# Patient Record
Sex: FEMALE | Race: WHITE
[De-identification: names, ages, dates, MRNs, and addresses within clinical notes are randomized per-mention and may not be internally consistent; named-entity substitution may affect disease eponyms.]

---

## 2017-03-01 ENCOUNTER — HOSPITAL ENCOUNTER (OUTPATIENT)
Dept: HOSPITAL 80 - BRMIMAGING | Age: 42
End: 2017-03-01
Attending: GENERAL ACUTE CARE HOSPITAL
Payer: COMMERCIAL

## 2017-03-01 DIAGNOSIS — Z12.31: Primary | ICD-10-CM

## 2017-03-01 PROCEDURE — G0202 SCR MAMMO BI INCL CAD: HCPCS

## 2018-03-26 ENCOUNTER — HOSPITAL ENCOUNTER (OUTPATIENT)
Dept: HOSPITAL 80 - BRMIMAGING | Age: 43
End: 2018-03-26
Attending: GENERAL ACUTE CARE HOSPITAL
Payer: COMMERCIAL

## 2018-03-26 DIAGNOSIS — Z12.31: Primary | ICD-10-CM

## 2019-03-27 ENCOUNTER — HOSPITAL ENCOUNTER (OUTPATIENT)
Dept: HOSPITAL 80 - BRMIMAGING | Age: 44
End: 2019-03-27
Attending: GENERAL ACUTE CARE HOSPITAL
Payer: COMMERCIAL

## 2019-03-27 DIAGNOSIS — Z80.3: ICD-10-CM

## 2019-03-27 DIAGNOSIS — Z12.31: Primary | ICD-10-CM

## 2021-12-10 ENCOUNTER — APPOINTMENT (RX ONLY)
Dept: URBAN - METROPOLITAN AREA CLINIC 134 | Facility: CLINIC | Age: 46
Setting detail: DERMATOLOGY
End: 2021-12-10

## 2021-12-10 DIAGNOSIS — L91.8 OTHER HYPERTROPHIC DISORDERS OF THE SKIN: ICD-10-CM

## 2021-12-10 DIAGNOSIS — B07.8 OTHER VIRAL WARTS: ICD-10-CM

## 2021-12-10 DIAGNOSIS — L72.0 EPIDERMAL CYST: ICD-10-CM

## 2021-12-10 DIAGNOSIS — B35.1 TINEA UNGUIUM: ICD-10-CM

## 2021-12-10 DIAGNOSIS — D22 MELANOCYTIC NEVI: ICD-10-CM

## 2021-12-10 DIAGNOSIS — Z71.89 OTHER SPECIFIED COUNSELING: ICD-10-CM

## 2021-12-10 PROBLEM — D22.5 MELANOCYTIC NEVI OF TRUNK: Status: ACTIVE | Noted: 2021-12-10

## 2021-12-10 PROCEDURE — 17110 DESTRUCTION B9 LES UP TO 14: CPT

## 2021-12-10 PROCEDURE — ? COUNSELING

## 2021-12-10 PROCEDURE — ? PRESCRIPTION

## 2021-12-10 PROCEDURE — ? LIQUID NITROGEN

## 2021-12-10 PROCEDURE — 99203 OFFICE O/P NEW LOW 30 MIN: CPT | Mod: 25

## 2021-12-10 RX ORDER — CICLOPIROX 80 MG/ML
SOLUTION TOPICAL
Qty: 6.6 | Refills: 6 | Status: ERX | COMMUNITY
Start: 2021-12-10

## 2021-12-10 RX ADMIN — CICLOPIROX: 80 SOLUTION TOPICAL at 00:00

## 2021-12-10 ASSESSMENT — LOCATION DETAILED DESCRIPTION DERM
LOCATION DETAILED: LEFT GREAT TOENAIL
LOCATION DETAILED: RIGHT AXILLARY VAULT
LOCATION DETAILED: RIGHT KNEE
LOCATION DETAILED: LEFT INFERIOR FOREHEAD
LOCATION DETAILED: SUPERIOR THORACIC SPINE
LOCATION DETAILED: RIGHT MEDIAL BREAST 2-3:00 REGION

## 2021-12-10 ASSESSMENT — LOCATION ZONE DERM
LOCATION ZONE: LEG
LOCATION ZONE: AXILLAE
LOCATION ZONE: FACE
LOCATION ZONE: TOENAIL
LOCATION ZONE: TRUNK

## 2021-12-10 ASSESSMENT — LOCATION SIMPLE DESCRIPTION DERM
LOCATION SIMPLE: RIGHT KNEE
LOCATION SIMPLE: RIGHT AXILLARY VAULT
LOCATION SIMPLE: UPPER BACK
LOCATION SIMPLE: RIGHT BREAST
LOCATION SIMPLE: LEFT FOREHEAD
LOCATION SIMPLE: LEFT GREAT TOE

## 2021-12-10 NOTE — PROCEDURE: LIQUID NITROGEN
Show Topical Anesthesia Variable?: Yes
Post-Care Instructions: I reviewed with the patient in detail post-care instructions. Patient is to wear sunprotection, and avoid picking at any of the treated lesions. Pt may apply Vaseline to crusted or scabbing areas.
Duration Of Freeze Thaw-Cycle (Seconds): 5-10
Number Of Freeze-Thaw Cycles: 3 freeze-thaw cycles
Medical Necessity Clause: This procedure was medically necessary because the lesions that were treated were:
Render Post-Care Instructions In Note?: no
Medical Necessity Information: It is in your best interest to select a reason for this procedure from the list below. All of these items fulfill various CMS LCD requirements except the new and changing color options.
Detail Level: Detailed
Consent: The patient's consent was obtained including but not limited to risks of crusting, scabbing, blistering, scarring, darker or lighter pigmentary change, recurrence, incomplete removal and infection.
Application Tool (Optional): Liquid Nitrogen Sprayer

## 2022-12-08 ENCOUNTER — APPOINTMENT (RX ONLY)
Dept: URBAN - METROPOLITAN AREA CLINIC 134 | Facility: CLINIC | Age: 47
Setting detail: DERMATOLOGY
End: 2022-12-08

## 2022-12-08 DIAGNOSIS — L81.4 OTHER MELANIN HYPERPIGMENTATION: ICD-10-CM

## 2022-12-08 DIAGNOSIS — B35.1 TINEA UNGUIUM: ICD-10-CM

## 2022-12-08 DIAGNOSIS — Z80.8 FAMILY HISTORY OF MALIGNANT NEOPLASM OF OTHER ORGANS OR SYSTEMS: ICD-10-CM

## 2022-12-08 DIAGNOSIS — D22 MELANOCYTIC NEVI: ICD-10-CM

## 2022-12-08 DIAGNOSIS — H01.13 ECZEMATOUS DERMATITIS OF EYELID: ICD-10-CM

## 2022-12-08 PROBLEM — H01.132 ECZEMATOUS DERMATITIS OF RIGHT LOWER EYELID: Status: ACTIVE | Noted: 2022-12-08

## 2022-12-08 PROBLEM — H01.131 ECZEMATOUS DERMATITIS OF RIGHT UPPER EYELID: Status: ACTIVE | Noted: 2022-12-08

## 2022-12-08 PROBLEM — H01.139 ECZEMATOUS DERMATITIS OF UNSPECIFIED EYE, UNSPECIFIED EYELID: Status: ACTIVE | Noted: 2022-12-08

## 2022-12-08 PROBLEM — D22.5 MELANOCYTIC NEVI OF TRUNK: Status: ACTIVE | Noted: 2022-12-08

## 2022-12-08 PROCEDURE — ? COUNSELING

## 2022-12-08 PROCEDURE — ? TREATMENT REGIMEN

## 2022-12-08 PROCEDURE — 99213 OFFICE O/P EST LOW 20 MIN: CPT

## 2022-12-08 PROCEDURE — ? PRESCRIPTION

## 2022-12-08 RX ORDER — HYDROCORTISONE 25 MG/G
OINTMENT TOPICAL BID
Qty: 28.35 | Refills: 1 | Status: ERX | COMMUNITY
Start: 2022-12-08

## 2022-12-08 RX ADMIN — HYDROCORTISONE: 25 OINTMENT TOPICAL at 00:00

## 2022-12-08 ASSESSMENT — SEVERITY ASSESSMENT: SEVERITY: MILD TO MODERATE

## 2022-12-08 ASSESSMENT — LOCATION SIMPLE DESCRIPTION DERM
LOCATION SIMPLE: LEFT GREAT TOE
LOCATION SIMPLE: RIGHT SUPERIOR EYELID
LOCATION SIMPLE: RIGHT UPPER BACK
LOCATION SIMPLE: LEFT CHEEK
LOCATION SIMPLE: LEFT EYEBROW
LOCATION SIMPLE: RIGHT LOWER BACK
LOCATION SIMPLE: RIGHT INFERIOR EYELID

## 2022-12-08 ASSESSMENT — LOCATION ZONE DERM
LOCATION ZONE: FACE
LOCATION ZONE: TOENAIL
LOCATION ZONE: TRUNK
LOCATION ZONE: EYELID

## 2022-12-08 ASSESSMENT — LOCATION DETAILED DESCRIPTION DERM
LOCATION DETAILED: RIGHT LATERAL SUPERIOR EYELID
LOCATION DETAILED: LEFT CENTRAL EYEBROW
LOCATION DETAILED: RIGHT SUPERIOR MEDIAL MIDBACK
LOCATION DETAILED: LEFT SUPERIOR CENTRAL MALAR CHEEK
LOCATION DETAILED: RIGHT SUPERIOR MEDIAL UPPER BACK
LOCATION DETAILED: LEFT GREAT TOENAIL
LOCATION DETAILED: RIGHT MEDIAL INFERIOR EYELID

## 2022-12-08 ASSESSMENT — PAIN INTENSITY VAS: HOW INTENSE IS YOUR PAIN 0 BEING NO PAIN, 10 BEING THE MOST SEVERE PAIN POSSIBLE?: 3/10 PAIN

## 2022-12-08 NOTE — PROCEDURE: COUNSELING
Detail Level: Detailed
Patient Specific Counseling (Will Not Stick From Patient To Patient): Recommend switching to Vaseline or CeraVe healing ointment and discontinuing Aquaphor.\\nIf dermatitis recurred after 2 week hydrocortisone therapy, discussed seeing allergist for patch testing.\\nApply ointment on eyelids prior to showering
Sunscreen Recommendations: Broad Spectrum Sunscreen 30+
Detail Level: Generalized
Detail Level: Simple

## 2022-12-08 NOTE — PROCEDURE: TREATMENT REGIMEN
Detail Level: Detailed
Initiate Treatment: Zyrtec 10mg BID \\nHydrocortisone 2.5% ointment to aa BID X 2 weeks
Plan: FU in one month
Otc Regimen: Stop using aerosolized air freshener, switch to all free and clear detergent\\nSwitch to Vaseline ointment or CeraVe healing ointment
Plan: Patient not interested in PO antifungals at this time

## 2022-12-08 NOTE — PROCEDURE: MIPS QUALITY
Quality 130: Documentation Of Current Medications In The Medical Record: Current Medications Documented
Detail Level: Generalized
Quality 431: Preventive Care And Screening: Unhealthy Alcohol Use - Screening: Patient not identified as an unhealthy alcohol user when screened for unhealthy alcohol use using a systematic screening method
Quality 110: Preventive Care And Screening: Influenza Immunization: Influenza Immunization previously received during influenza season

## 2023-01-04 ENCOUNTER — RX ONLY (OUTPATIENT)
Age: 48
Setting detail: RX ONLY
End: 2023-01-04

## 2023-01-04 RX ORDER — PIMECROLIMUS 10 MG/G
CREAM TOPICAL
Qty: 30 | Refills: 2 | Status: ERX | COMMUNITY
Start: 2023-01-03

## 2023-01-26 ENCOUNTER — APPOINTMENT (RX ONLY)
Dept: URBAN - METROPOLITAN AREA CLINIC 12 | Facility: CLINIC | Age: 48
Setting detail: DERMATOLOGY
End: 2023-01-26

## 2023-01-26 DIAGNOSIS — L20.89 OTHER ATOPIC DERMATITIS: ICD-10-CM | Status: INADEQUATELY CONTROLLED

## 2023-01-26 PROCEDURE — ? TREATMENT REGIMEN

## 2023-01-26 PROCEDURE — ? PRESCRIPTION

## 2023-01-26 PROCEDURE — ? COUNSELING

## 2023-01-26 PROCEDURE — 99214 OFFICE O/P EST MOD 30 MIN: CPT

## 2023-01-26 ASSESSMENT — LOCATION SIMPLE DESCRIPTION DERM
LOCATION SIMPLE: RIGHT INFERIOR EYELID
LOCATION SIMPLE: LEFT EYEBROW
LOCATION SIMPLE: RIGHT SUPERIOR EYELID
LOCATION SIMPLE: LEFT CHEEK

## 2023-01-26 ASSESSMENT — LOCATION ZONE DERM
LOCATION ZONE: FACE
LOCATION ZONE: EYELID

## 2023-01-26 ASSESSMENT — LOCATION DETAILED DESCRIPTION DERM
LOCATION DETAILED: LEFT SUPERIOR CENTRAL MALAR CHEEK
LOCATION DETAILED: RIGHT MEDIAL INFERIOR EYELID
LOCATION DETAILED: RIGHT LATERAL SUPERIOR EYELID
LOCATION DETAILED: LEFT CENTRAL EYEBROW

## 2023-01-26 NOTE — PROCEDURE: TREATMENT REGIMEN
Detail Level: Detailed
Discontinue Regimen: Systane eye drops daily
Continue Regimen: Hydrocortisone 2.5% ointment apply to eyelids BID for 1-2 weeks when flared then switch to Opzelura cream
Plan: STOP using Systane eye drops (contains propylene glycol) and switch to Refresh lubricant eye drops (propylene glycol-free). Refer to [facility] for patch testing\\n\\nFollow up in 6 weeks or after patch testing
Samples Given: Opzelura cream
Otc Regimen: Vaseline or Aquaphor

## 2023-01-26 NOTE — PROCEDURE: COUNSELING
Detail Level: Detailed
Patient Specific Counseling (Will Not Stick From Patient To Patient): Recommend switching to Vaseline or CeraVe healing ointment and discontinuing Aquaphor.\\nIf dermatitis recurred after 2 week hydrocortisone therapy, discussed seeing allergist for patch testing.\\nApply ointment on eyelids prior to showering

## 2023-01-27 RX ADMIN — RUXOLITINIB APPLY: 15 CREAM TOPICAL at 00:00

## 2023-01-28 RX ORDER — RUXOLITINIB 15 MG/G
CREAM TOPICAL BID
Qty: 60 | Refills: 2 | Status: ERX | COMMUNITY
Start: 2023-01-27

## 2024-02-01 ENCOUNTER — APPOINTMENT (RX ONLY)
Dept: URBAN - METROPOLITAN AREA CLINIC 134 | Facility: CLINIC | Age: 49
Setting detail: DERMATOLOGY
End: 2024-02-01

## 2024-02-01 DIAGNOSIS — Z71.89 OTHER SPECIFIED COUNSELING: ICD-10-CM

## 2024-02-01 DIAGNOSIS — L82.1 OTHER SEBORRHEIC KERATOSIS: ICD-10-CM

## 2024-02-01 DIAGNOSIS — L81.4 OTHER MELANIN HYPERPIGMENTATION: ICD-10-CM

## 2024-02-01 DIAGNOSIS — D22 MELANOCYTIC NEVI: ICD-10-CM

## 2024-02-01 DIAGNOSIS — L91.8 OTHER HYPERTROPHIC DISORDERS OF THE SKIN: ICD-10-CM

## 2024-02-01 DIAGNOSIS — L92.0 GRANULOMA ANNULARE: ICD-10-CM

## 2024-02-01 DIAGNOSIS — L20.89 OTHER ATOPIC DERMATITIS: ICD-10-CM

## 2024-02-01 PROBLEM — D22.62 MELANOCYTIC NEVI OF LEFT UPPER LIMB, INCLUDING SHOULDER: Status: ACTIVE | Noted: 2024-02-01

## 2024-02-01 PROBLEM — D23.71 OTHER BENIGN NEOPLASM OF SKIN OF RIGHT LOWER LIMB, INCLUDING HIP: Status: ACTIVE | Noted: 2024-02-01

## 2024-02-01 PROCEDURE — ? COUNSELING

## 2024-02-01 PROCEDURE — ? PRESCRIPTION

## 2024-02-01 PROCEDURE — ? TREATMENT REGIMEN

## 2024-02-01 PROCEDURE — 99214 OFFICE O/P EST MOD 30 MIN: CPT

## 2024-02-01 RX ORDER — TRIAMCINOLONE ACETONIDE 1 MG/G
CREAM TOPICAL
Qty: 45 | Refills: 1 | Status: ERX | COMMUNITY
Start: 2024-02-01

## 2024-02-01 RX ADMIN — TRIAMCINOLONE ACETONIDE: 1 CREAM TOPICAL at 00:00

## 2024-02-01 ASSESSMENT — LOCATION DETAILED DESCRIPTION DERM
LOCATION DETAILED: LEFT ANTERIOR PROXIMAL THIGH
LOCATION DETAILED: LEFT ANTERIOR MEDIAL PROXIMAL UPPER ARM
LOCATION DETAILED: RIGHT MEDIAL INFERIOR EYELID
LOCATION DETAILED: RIGHT AXILLARY VAULT
LOCATION DETAILED: LEFT SUPERIOR CENTRAL MALAR CHEEK
LOCATION DETAILED: RIGHT MEDIAL UPPER BACK
LOCATION DETAILED: LEFT CENTRAL EYEBROW
LOCATION DETAILED: LEFT ANTERIOR PROXIMAL UPPER ARM
LOCATION DETAILED: RIGHT LATERAL SUPERIOR EYELID
LOCATION DETAILED: LEFT LATERAL FOREHEAD

## 2024-02-01 ASSESSMENT — LOCATION ZONE DERM
LOCATION ZONE: TRUNK
LOCATION ZONE: LEG
LOCATION ZONE: AXILLAE
LOCATION ZONE: FACE
LOCATION ZONE: ARM
LOCATION ZONE: EYELID

## 2024-02-01 ASSESSMENT — LOCATION SIMPLE DESCRIPTION DERM
LOCATION SIMPLE: LEFT CHEEK
LOCATION SIMPLE: RIGHT AXILLARY VAULT
LOCATION SIMPLE: LEFT FOREHEAD
LOCATION SIMPLE: LEFT EYEBROW
LOCATION SIMPLE: LEFT THIGH
LOCATION SIMPLE: LEFT UPPER ARM
LOCATION SIMPLE: RIGHT SUPERIOR EYELID
LOCATION SIMPLE: RIGHT INFERIOR EYELID
LOCATION SIMPLE: RIGHT UPPER BACK

## 2024-02-01 ASSESSMENT — SEVERITY ASSESSMENT 2020: SEVERITY 2020: ALMOST CLEAR

## 2024-02-01 NOTE — PROCEDURE: TREATMENT REGIMEN
Detail Level: Detailed
Continue Regimen: Hydrocortisone 2.5% ointment apply to eyelids BID for 1-2 weeks when flared\\nOpzelura cream to aa BID prn flares \\nVanicream healing ointment
Plan: Patient declined patch testing when she went to Allergy consultation, she’s satisfied with treating PRN with topicals
Samples Given: Vanicream healing ointment
Initiate Treatment: Triamcinolone 0.1% cream apply BID to aa of left upper thigh for 2 weeks, then once daily x 1 week
Detail Level: Simple
Plan: If not resolving with topical steroid, discussed trial of antifungal. However, no scale on exam today for KOH

## 2024-02-01 NOTE — PROCEDURE: COUNSELING
Detail Level: Detailed
Patient Specific Counseling (Will Not Stick From Patient To Patient): Recommend switching to Vaseline or CeraVe healing ointment and discontinuing Aquaphor.\\nIf dermatitis recurred after 2 week hydrocortisone therapy, discussed seeing allergist for patch testing.\\nApply ointment on eyelids prior to showering
Detail Level: Simple
Sunscreen Recommendations: Broad spectrum sunscreen of 30+
Detail Level: Zone
Detail Level: Generalized
Sunscreen Recommendations: Broad Spectrum Sunscreen 30+

## 2024-02-01 NOTE — HPI: SKIN LESION
Is This A New Presentation, Or A Follow-Up?: Skin Lesion
How Severe Is Your Skin Lesion?: moderate
Has Your Skin Lesion Been Treated?: not been treated
Additional History: Patient has used, aloe, vaseline, mupirocin and nothing resolved. She gets more itchy at night and is not scaly.
Which Family Member (Optional)?: Father